# Patient Record
Sex: MALE | Race: WHITE | NOT HISPANIC OR LATINO | RURAL
[De-identification: names, ages, dates, MRNs, and addresses within clinical notes are randomized per-mention and may not be internally consistent; named-entity substitution may affect disease eponyms.]

---
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
## 2024-06-10 ENCOUNTER — OFFICE VISIT (OUTPATIENT)
Dept: FAMILY MEDICINE | Facility: CLINIC | Age: 19
End: 2024-06-10
Payer: OTHER GOVERNMENT

## 2024-06-10 VITALS
SYSTOLIC BLOOD PRESSURE: 103 MMHG | HEART RATE: 99 BPM | TEMPERATURE: 98 F | BODY MASS INDEX: 21.54 KG/M2 | OXYGEN SATURATION: 99 % | HEIGHT: 72 IN | RESPIRATION RATE: 17 BRPM | WEIGHT: 159 LBS | DIASTOLIC BLOOD PRESSURE: 67 MMHG

## 2024-06-10 DIAGNOSIS — F41.9 ANXIETY DISORDER OF ADOLESCENCE: Chronic | ICD-10-CM

## 2024-06-10 DIAGNOSIS — F90.0 ADHD (ATTENTION DEFICIT HYPERACTIVITY DISORDER), INATTENTIVE TYPE: Chronic | ICD-10-CM

## 2024-06-10 DIAGNOSIS — H66.91 OTITIS OF RIGHT EAR: Primary | ICD-10-CM

## 2024-06-10 PROCEDURE — 99214 OFFICE O/P EST MOD 30 MIN: CPT | Mod: ,,, | Performed by: STUDENT IN AN ORGANIZED HEALTH CARE EDUCATION/TRAINING PROGRAM

## 2024-06-10 RX ORDER — AMOXICILLIN AND CLAVULANATE POTASSIUM 875; 125 MG/1; MG/1
1 TABLET, FILM COATED ORAL EVERY 12 HOURS
Qty: 14 TABLET | Refills: 0 | Status: SHIPPED | OUTPATIENT
Start: 2024-06-10 | End: 2024-06-17

## 2024-06-10 RX ORDER — ATOMOXETINE 60 MG/1
60 CAPSULE ORAL DAILY
Qty: 30 CAPSULE | Refills: 0 | Status: SHIPPED | OUTPATIENT
Start: 2024-06-10

## 2024-06-10 RX ORDER — ESCITALOPRAM OXALATE 20 MG/1
20 TABLET ORAL DAILY
Qty: 30 TABLET | Refills: 0 | Status: SHIPPED | OUTPATIENT
Start: 2024-06-10 | End: 2024-07-10

## 2024-06-10 NOTE — PATIENT INSTRUCTIONS
Do not put anything in your ear unless it was ordered by the doctor.  You may want to take medicines like ibuprofen, naproxen, or acetaminophen to help with pain.  Complete all antibiotics       When do I need to call the doctor?   Your symptoms are not getting better in 2 to 3 days.  You continue to have problems hearing after 2 to 3 weeks.  You have a fever of 100.4°F (38°C) or higher or chills.  You have discharge or fluid coming from your ear.

## 2024-06-10 NOTE — PROGRESS NOTES
[facility]    Chief Complaint      Chief Complaint   Patient presents with    Otalgia     Right ear pain, with clear drainage xfew days. Has gotten worse since first started. Has been using neomycin otic sol       History of Present Illness      Neno Bonilla is a 18 y.o. male with chronic conditions of adhd, anxiety, hypermobility of joint, and on the spectrum per mom who presents today for right ear pain and swelling, has drainage, has been using neomycin otic which has not helped his issue and its has progressively worsened.      Has history of adhd and anxiety, has been taking strattera and lexapro.  Pt was seeing Dr. Morgan here but aged out and started using the clinic at [facility] who was presscribing this medication, only has 1 day left and is asking for a 30 day refill while they are finding another provider to see him for this. Recommendations given. Ms  checked, will give a 30 day supply only to give pt time to find a provider.         Past Medical History:  Past Medical History:   Diagnosis Date    ADHD (attention deficit hyperactivity disorder)     Anxiety     Hypermobility of joint        Past Surgical History:   has a past surgical history that includes Tympanostomy tube placement and Circumcision.    Social History:  Social History     Tobacco Use    Smoking status: Never    Smokeless tobacco: Never       I personally reviewed all past medical, surgical, and social.     Review of Systems   Constitutional:  Negative for chills and fever.   HENT:  Positive for ear discharge and ear pain. Negative for nasal congestion, postnasal drip, rhinorrhea, sinus pressure/congestion and sore throat.    Respiratory:  Negative for shortness of breath and wheezing.    Cardiovascular:  Negative for chest pain.        Medications:  Outpatient Encounter Medications as of 6/10/2024   Medication Sig Dispense Refill    [DISCONTINUED] atomoxetine (STRATTERA) 60 MG capsule Take 1 capsule (60 mg total) by mouth once  daily. 90 capsule 0    [DISCONTINUED] EScitalopram oxalate (LEXAPRO) 20 MG tablet Take 1 tablet (20 mg total) by mouth once daily. 90 tablet 0    amoxicillin-clavulanate 875-125mg (AUGMENTIN) 875-125 mg per tablet Take 1 tablet by mouth every 12 (twelve) hours. for 7 days 14 tablet 0    atomoxetine (STRATTERA) 60 MG capsule Take 1 capsule (60 mg total) by mouth once daily. 30 capsule 0    EScitalopram oxalate (LEXAPRO) 20 MG tablet Take 1 tablet (20 mg total) by mouth once daily. 30 tablet 0    hydrOXYzine HCL (ATARAX) 25 MG tablet Take 1-2 tabs PRN anxiety (Patient not taking: Reported on 6/10/2024) 30 tablet 0    hydrOXYzine pamoate (VISTARIL) 25 MG Cap  (Patient not taking: Reported on 6/10/2024)      ISOtretinoin (ACCUTANE) 40 MG capsule Take 1 capsule (40 mg total) by mouth 2 (two) times daily. (Patient not taking: Reported on 1/5/2023) 60 capsule 0    melatonin (MELATIN) Take 5 mg by mouth. (Patient not taking: Reported on 6/10/2024)       No facility-administered encounter medications on file as of 6/10/2024.       Allergies:  Review of patient's allergies indicates:  No Known Allergies    Health Maintenance:  Immunization History   Administered Date(s) Administered    COVID-19, MRNA, LN-S, PF (Pfizer) (Purple Cap) 05/20/2021, 06/10/2021    DTaP 02/21/2006    DTaP / Hep B / IPV 2004, 02/27/2005, 04/26/2005    DTaP / IPV 06/15/2009    Hepatitis A, Pediatric/Adolescent, 2 Dose 11/19/2020, 11/23/2021    HiB PRP-OMP 2004, 02/28/2005, 02/21/2006    Influenza - Intranasal 12/12/2014    Influenza - Quadrivalent 10/24/2006    MMR 06/15/2009    MMRV 11/11/2005    Meningococcal B, OMV 11/19/2020, 11/23/2021    Meningococcal Conjugate (MCV4O) 2 Vial (2mo-55yr) 10/31/2016    Meningococcal Conjugate (MCV4P) 11/19/2020    Pneumococcal Conjugate - 7 Valent 2004, 02/28/2005, 04/26/2005, 11/11/2005    Tdap 10/31/2016    Varicella 06/15/2009      Health Maintenance   Topic Date Due    Hepatitis C Screening   Never done    HPV Vaccines (1 - Male 3-dose series) Never done    TETANUS VACCINE  10/31/2026    Lipid Panel  Completed        Physical Exam      Vital Signs  Temp: 97.9 °F (36.6 °C)  Temp Source: Oral  Pulse: 99  Resp: 17  SpO2: 99 %  BP: 103/67  BP Location: Right arm  Patient Position: Sitting  Height and Weight  Height: 6' (182.9 cm)  Weight: 72.1 kg (159 lb)  BSA (Calculated - sq m): 1.91 sq meters  BMI (Calculated): 21.6  Weight in (lb) to have BMI = 25: 183.9]    Physical Exam  Constitutional:       General: He is not in acute distress.     Appearance: Normal appearance. He is not ill-appearing.   HENT:      Right Ear: Drainage, swelling and tenderness present. Tympanic membrane is erythematous.   Cardiovascular:      Rate and Rhythm: Normal rate and regular rhythm.   Pulmonary:      Effort: Pulmonary effort is normal.      Breath sounds: Normal breath sounds.   Skin:     General: Skin is warm and dry.   Neurological:      General: No focal deficit present.      Mental Status: He is alert and oriented to person, place, and time. Mental status is at baseline.          Laboratory:  CBC:  Recent Labs   Lab 04/04/23  1711   WBC 4.67   RBC 4.49 L   Hemoglobin 14.4   Hematocrit 41.6   Platelet Count 219   MCV 92.7   MCH 32.1 H   MCHC 34.6     CMP:  Recent Labs   Lab 06/29/22  1547 07/27/22  1123 09/06/22  1519   Albumin 4.2 4.0 4.1   Total Protein 7.4 7.3 7.1   Alk Phos 105 97 93   ALT 17 16 18   AST 17 17 25   Bilirubin, Total 0.9 0.8 1.1     LIPIDS:  Recent Labs   Lab 06/29/22  1547 07/27/22  1123 09/06/22  1519   Triglycerides 96 117 96     TSH:      A1C:        Assessment/Plan     Neno Bonilla is a 18 y.o.male with:    1. Otitis of right ear  -     amoxicillin-clavulanate 875-125mg (AUGMENTIN) 875-125 mg per tablet; Take 1 tablet by mouth every 12 (twelve) hours. for 7 days  Dispense: 14 tablet; Refill: 0    2. ADHD (attention deficit hyperactivity disorder), inattentive type  -     atomoxetine (STRATTERA) 60  MG capsule; Take 1 capsule (60 mg total) by mouth once daily.  Dispense: 30 capsule; Refill: 0    3. Anxiety disorder of adolescence  -     EScitalopram oxalate (LEXAPRO) 20 MG tablet; Take 1 tablet (20 mg total) by mouth once daily.  Dispense: 30 tablet; Refill: 0         Chronic conditions status updated as per HPI.  Other than changes above, cont current medications and maintain follow up with specialists.  Return to clinic as needed.     Patient Instructions   Do not put anything in your ear unless it was ordered by the doctor.  You may want to take medicines like ibuprofen, naproxen, or acetaminophen to help with pain.  Complete all antibiotics       When do I need to call the doctor?   Your symptoms are not getting better in 2 to 3 days.  You continue to have problems hearing after 2 to 3 weeks.  You have a fever of 100.4°F (38°C) or higher or chills.  You have discharge or fluid coming from your ear.           Prachi Bautista, FNP-BC  [facility]

## 2024-06-14 ENCOUNTER — OFFICE VISIT (OUTPATIENT)
Dept: FAMILY MEDICINE | Facility: CLINIC | Age: 19
End: 2024-06-14
Payer: OTHER GOVERNMENT

## 2024-06-14 ENCOUNTER — OFFICE VISIT (OUTPATIENT)
Dept: OTOLARYNGOLOGY | Facility: CLINIC | Age: 19
End: 2024-06-14
Payer: OTHER GOVERNMENT

## 2024-06-14 VITALS
TEMPERATURE: 98 F | OXYGEN SATURATION: 97 % | BODY MASS INDEX: 21.09 KG/M2 | DIASTOLIC BLOOD PRESSURE: 68 MMHG | SYSTOLIC BLOOD PRESSURE: 105 MMHG | WEIGHT: 159.13 LBS | HEIGHT: 73 IN | HEART RATE: 93 BPM

## 2024-06-14 VITALS — BODY MASS INDEX: 21.07 KG/M2 | HEIGHT: 73 IN | WEIGHT: 159 LBS

## 2024-06-14 DIAGNOSIS — H60.10: ICD-10-CM

## 2024-06-14 DIAGNOSIS — H66.91 OTITIS OF RIGHT EAR: Primary | ICD-10-CM

## 2024-06-14 DIAGNOSIS — H60.313 DIFFUSE OTITIS EXTERNA OF BOTH EARS, UNSPECIFIED CHRONICITY: Primary | ICD-10-CM

## 2024-06-14 DIAGNOSIS — H92.12 OTORRHEA, LEFT: ICD-10-CM

## 2024-06-14 PROCEDURE — 99204 OFFICE O/P NEW MOD 45 MIN: CPT | Mod: S$PBB,,, | Performed by: OTOLARYNGOLOGY

## 2024-06-14 PROCEDURE — 99213 OFFICE O/P EST LOW 20 MIN: CPT | Mod: PBBFAC | Performed by: OTOLARYNGOLOGY

## 2024-06-14 PROCEDURE — 99999 PR PBB SHADOW E&M-EST. PATIENT-LVL III: CPT | Mod: PBBFAC,,, | Performed by: OTOLARYNGOLOGY

## 2024-06-14 PROCEDURE — 99214 OFFICE O/P EST MOD 30 MIN: CPT | Mod: ,,, | Performed by: STUDENT IN AN ORGANIZED HEALTH CARE EDUCATION/TRAINING PROGRAM

## 2024-06-14 RX ORDER — NEOMYCIN SULFATE, POLYMYXIN B SULFATE AND HYDROCORTISONE 10; 3.5; 1 MG/ML; MG/ML; [USP'U]/ML
3 SUSPENSION/ DROPS AURICULAR (OTIC) 3 TIMES DAILY
Qty: 10 ML | Refills: 1 | Status: SHIPPED | OUTPATIENT
Start: 2024-06-14

## 2024-06-14 NOTE — PROGRESS NOTES
Subjective:       Patient ID: Neno Bonilla is a 18 y.o. male.    Chief Complaint: Otitis Media (Patient states he is currently taking antibiotics for an ear infection. ), Tinnitus (Patient complains of ringing in his left ear.), and Cyst (Patient complains of having a cyst in his right ear.)    Otitis Media:    Associated symptoms: Ear pain.    Ringing in Ears:    Associated symptoms: Ear pain and tinnitus.    Cyst      Review of Systems   HENT:  Positive for ear discharge, ear pain and tinnitus.    All other systems reviewed and are negative.      Objective:      Physical Exam  General: NAD  Head: Normocephalic, atraumatic, no facial asymmetry/normal strength,  Ears: Both auricules normal in appearance, w/o deformities tympanic membranes normal external auditory canals debris suctioned from canals Cyst right self draining   Nose: External nose w/o deformities normal turbinates no drainage or inflammation  Oral Cavity: Lips, gums, floor of mouth, tongue hard palate, and buccal mucosa without mass/lesion  Oropharynx: Mucosa pink and moist, soft palate, posterior pharynx and oropharyngeal wall without mass/lesion  Neck: Supple, symmetric, trachea midline, no palpable mass/lesion, no palpable cervical lymphadenopathy  Skin: Warm and dry, no concerning lesions  Respiratory: Respirations even, unlabored  Assessment:       1. Diffuse otitis externa of both ears, unspecified chronicity    2. Otorrhea, left    3. Cellulitis of ear canal, unspecified laterality        Plan:       Cortisporin  drops continue antibiotics   F/u 2 weeks

## 2024-06-14 NOTE — PROGRESS NOTES
[facility]    Chief Complaint      Chief Complaint   Patient presents with    Ear Drainage     Pt was here last week for ear infection also started bleeding.       History of Present Illness      [facility] is a 18 y.o. male with chronic conditions of adhd, anxiety, hypermobility of joint  who presents today for persistent ear drainage.  Cyst of right ear has started to drain, left ear has some bleeding. Has been rinsing both ears with peroxide, states both ears feel full/clogged. Is on day 4 of a 7 day course of oral antibiotics, has also had antibiotic otic ear drops. Muffled hearing left ear, right ear hearing has improved, c/o tinnituts. Bleeding to left ear a couple days ago.     Ear Drainage   Associated symptoms include ear discharge and hearing loss (c/o decreased hearing). Pertinent negatives include no rhinorrhea or sore throat.       Past Medical History:  Past Medical History:   Diagnosis Date    ADHD (attention deficit hyperactivity disorder)     Anxiety     Hypermobility of joint        Past Surgical History:   has a past surgical history that includes Tympanostomy tube placement and Circumcision.    Social History:  Social History     Tobacco Use    Smoking status: Never    Smokeless tobacco: Never       I personally reviewed all past medical, surgical, and social.     Review of Systems   Constitutional:  Negative for fever.   HENT:  Positive for ear discharge, hearing loss (c/o decreased hearing) and tinnitus. Negative for ear pain, postnasal drip, rhinorrhea, sinus pressure/congestion, sneezing and sore throat.    Respiratory:  Negative for shortness of breath and wheezing.    Cardiovascular:  Negative for chest pain.   Allergic/Immunologic: Positive for environmental allergies.        Medications:  Outpatient Encounter Medications as of 6/14/2024   Medication Sig Dispense Refill    amoxicillin-clavulanate 875-125mg (AUGMENTIN) 875-125 mg per tablet Take 1 tablet by mouth every 12  "(twelve) hours. for 7 days 14 tablet 0    atomoxetine (STRATTERA) 60 MG capsule Take 1 capsule (60 mg total) by mouth once daily. 30 capsule 0    EScitalopram oxalate (LEXAPRO) 20 MG tablet Take 1 tablet (20 mg total) by mouth once daily. 30 tablet 0    hydrOXYzine HCL (ATARAX) 25 MG tablet Take 1-2 tabs PRN anxiety 30 tablet 0    hydrOXYzine pamoate (VISTARIL) 25 MG Cap       melatonin (MELATIN) Take 5 mg by mouth.      ISOtretinoin (ACCUTANE) 40 MG capsule Take 1 capsule (40 mg total) by mouth 2 (two) times daily. (Patient not taking: Reported on 1/5/2023) 60 capsule 0     No facility-administered encounter medications on file as of 6/14/2024.       Allergies:  Review of patient's allergies indicates:  No Known Allergies    Health Maintenance:  Immunization History   Administered Date(s) Administered    COVID-19, MRNA, LN-S, PF (Pfizer) (Purple Cap) 05/20/2021, 06/10/2021    DTaP 02/21/2006    DTaP / Hep B / IPV 2004, 02/27/2005, 04/26/2005    DTaP / IPV 06/15/2009    Hepatitis A, Pediatric/Adolescent, 2 Dose 11/19/2020, 11/23/2021    HiB PRP-OMP 2004, 02/28/2005, 02/21/2006    Influenza - Intranasal 12/12/2014    Influenza - Quadrivalent 10/24/2006    MMR 06/15/2009    MMRV 11/11/2005    Meningococcal B, OMV 11/19/2020, 11/23/2021    Meningococcal Conjugate (MCV4O) 2 Vial (2mo-55yr) 10/31/2016    Meningococcal Conjugate (MCV4P) 11/19/2020    Pneumococcal Conjugate - 7 Valent 2004, 02/28/2005, 04/26/2005, 11/11/2005    Tdap 10/31/2016    Varicella 06/15/2009      Health Maintenance   Topic Date Due    Hepatitis C Screening  Never done    HPV Vaccines (1 - Male 3-dose series) Never done    TETANUS VACCINE  10/31/2026    Lipid Panel  Completed        Physical Exam      Vital Signs  Temp: 98.1 °F (36.7 °C)  Temp Source: Oral  Pulse: 93  SpO2: 97 %  BP: 105/68  BP Location: Left arm  Patient Position: Sitting  Height and Weight  Height: 6' 1" (185.4 cm)  Weight: 72.2 kg (159 lb 2 oz)  BSA (Calculated " - sq m): 1.93 sq meters  BMI (Calculated): 21  Weight in (lb) to have BMI = 25: 189.1]    Physical Exam  Constitutional:       Appearance: Normal appearance.   HENT:      Ears:      Comments: Cyst to right ear canal has decreased in size from previous visit, canal is otherwise very dry in appearance       Cardiovascular:      Rate and Rhythm: Normal rate and regular rhythm.   Pulmonary:      Effort: Pulmonary effort is normal.      Breath sounds: Normal breath sounds.   Skin:     General: Skin is warm and dry.   Neurological:      General: No focal deficit present.      Mental Status: He is alert and oriented to person, place, and time. Mental status is at baseline.          Laboratory:  CBC:  Recent Labs   Lab 04/04/23  1711   WBC 4.67   RBC 4.49 L   Hemoglobin 14.4   Hematocrit 41.6   Platelet Count 219   MCV 92.7   MCH 32.1 H   MCHC 34.6     CMP:  Recent Labs   Lab 06/29/22  1547 07/27/22  1123 09/06/22  1519   Albumin 4.2 4.0 4.1   Total Protein 7.4 7.3 7.1   Alk Phos 105 97 93   ALT 17 16 18   AST 17 17 25   Bilirubin, Total 0.9 0.8 1.1     LIPIDS:  Recent Labs   Lab 06/29/22  1547 07/27/22  1123 09/06/22  1519   Triglycerides 96 117 96     TSH:      A1C:        Assessment/Plan     Neno Bonilla is a 18 y.o.male with:    1. Otitis of right ear  -     Ambulatory referral/consult to ENT; Future; Expected date: 06/21/2024  - continue Augmentin pending recommendations from ENT         Chronic conditions status updated as per HPI.  Other than changes above, cont current medications and maintain follow up with specialists.  Return to clinic as needed.     Patient Instructions   Report to ENT today at 11AM on the 5th floor of the ambulatory building.      Prachi Bautista, [facility]

## 2024-07-01 ENCOUNTER — OFFICE VISIT (OUTPATIENT)
Dept: OTOLARYNGOLOGY | Facility: CLINIC | Age: 19
End: 2024-07-01
Payer: OTHER GOVERNMENT

## 2024-07-01 VITALS — BODY MASS INDEX: 21.07 KG/M2 | WEIGHT: 159 LBS | HEIGHT: 73 IN

## 2024-07-01 DIAGNOSIS — H66.91 OTITIS OF RIGHT EAR: ICD-10-CM

## 2024-07-01 DIAGNOSIS — H60.313 DIFFUSE OTITIS EXTERNA OF BOTH EARS, UNSPECIFIED CHRONICITY: Primary | ICD-10-CM

## 2024-07-01 PROCEDURE — 99213 OFFICE O/P EST LOW 20 MIN: CPT | Mod: PBBFAC | Performed by: OTOLARYNGOLOGY

## 2024-07-01 PROCEDURE — 99213 OFFICE O/P EST LOW 20 MIN: CPT | Mod: S$PBB,,, | Performed by: OTOLARYNGOLOGY

## 2024-07-01 PROCEDURE — 99999 PR PBB SHADOW E&M-EST. PATIENT-LVL III: CPT | Mod: PBBFAC,,, | Performed by: OTOLARYNGOLOGY

## 2024-07-01 NOTE — PROGRESS NOTES
Subjective:       Patient ID: Neno Bonilla is a 18 y.o. male.    Chief Complaint: Follow-up (2 week follow up. Patient voices no complaints.)    Follow-up      Review of Systems   HENT:  Positive for ear discharge and ear pain.    All other systems reviewed and are negative.      Objective:      Physical Exam  General: NAD  Head: Normocephalic, atraumatic, no facial asymmetry/normal strength,  Ears: Both auricules normal in appearance, w/o deformities tympanic membranes normal external auditory canals normal  Nose: External nose w/o deformities normal turbinates no drainage or inflammation  Oral Cavity: Lips, gums, floor of mouth, tongue hard palate, and buccal mucosa without mass/lesion  Oropharynx: Mucosa pink and moist, soft palate, posterior pharynx and oropharyngeal wall without mass/lesion  Neck: Supple, symmetric, trachea midline, no palpable mass/lesion, no palpable cervical lymphadenopathy  Skin: Warm and dry, no concerning lesions  Respiratory: Respirations even, unlabored  Assessment:       1. Diffuse otitis externa of both ears, unspecified chronicity        Plan:       Much improved use drops prn